# Patient Record
Sex: MALE
[De-identification: names, ages, dates, MRNs, and addresses within clinical notes are randomized per-mention and may not be internally consistent; named-entity substitution may affect disease eponyms.]

---

## 2024-11-19 ENCOUNTER — TELEPHONE (OUTPATIENT)
Dept: INTERNAL MEDICINE CLINIC | Facility: CLINIC | Age: 64
End: 2024-11-19

## 2024-11-19 NOTE — TELEPHONE ENCOUNTER
I have talked with Mr. Hamilton and he stated that he knew nothing about needing a new patient visit.  He stated that his wife may of tried to make it, but he isn't needing one.  He will call back at a later time if he needs one.

## 2024-11-19 NOTE — TELEPHONE ENCOUNTER
----- Message from Princess BRIGGS sent at 11/19/2024 10:44 AM EST -----  Regarding: [facility] Appointment Request  [facility] Appointment Request    Patient needs appointment for [facility] Appointment Type: New Patient.    Patient Requested Dates(s): As soon as possible    Patient Requested Time: anytime    Provider Name: [facility] at [facility]    Reason for Appointment Request: New Patient - No appointments available during search  --------------------------------------------------------------------------------------------------------------------------    Relationship to Patient: Spouse/Partner      Call Back Information: OK to leave message on voicemail  Preferred Call Back Number: [phone] and   [phone]